# Patient Record
Sex: MALE | Race: WHITE | NOT HISPANIC OR LATINO | Employment: FULL TIME | URBAN - METROPOLITAN AREA
[De-identification: names, ages, dates, MRNs, and addresses within clinical notes are randomized per-mention and may not be internally consistent; named-entity substitution may affect disease eponyms.]

---

## 2020-02-19 ENCOUNTER — TELEPHONE (OUTPATIENT)
Dept: SCHEDULING | Facility: IMAGING CENTER | Age: 60
End: 2020-02-19

## 2020-02-21 ENCOUNTER — OFFICE VISIT (OUTPATIENT)
Dept: URGENT CARE | Facility: PHYSICIAN GROUP | Age: 60
End: 2020-02-21
Payer: COMMERCIAL

## 2020-02-21 VITALS
WEIGHT: 219.6 LBS | BODY MASS INDEX: 29.1 KG/M2 | DIASTOLIC BLOOD PRESSURE: 86 MMHG | HEIGHT: 73 IN | RESPIRATION RATE: 18 BRPM | OXYGEN SATURATION: 96 % | TEMPERATURE: 98.4 F | HEART RATE: 84 BPM | SYSTOLIC BLOOD PRESSURE: 120 MMHG

## 2020-02-21 DIAGNOSIS — H65.92 FLUID LEVEL BEHIND TYMPANIC MEMBRANE OF LEFT EAR: ICD-10-CM

## 2020-02-21 PROCEDURE — 99203 OFFICE O/P NEW LOW 30 MIN: CPT | Performed by: NURSE PRACTITIONER

## 2020-02-21 RX ORDER — FLUTICASONE PROPIONATE 50 MCG
1 SPRAY, SUSPENSION (ML) NASAL DAILY
Qty: 16 G | Refills: 0 | Status: SHIPPED | OUTPATIENT
Start: 2020-02-21 | End: 2020-10-16

## 2020-02-21 ASSESSMENT — ENCOUNTER SYMPTOMS
COUGH: 0
HEADACHES: 0
FEVER: 0
BLURRED VISION: 0
CHILLS: 0
DOUBLE VISION: 0
NAUSEA: 0
DIZZINESS: 0

## 2020-02-21 NOTE — PROGRESS NOTES
Subjective:      Kameron Garcia is a 59 y.o. male who presents with Ringing in Ear (L ear, cant hear, plugged up, ringing, x1 week )            Ear Fullness   This is a new problem. Episode onset: 7 days. The problem occurs constantly. The problem has been unchanged. Pertinent negatives include no chills, congestion, coughing, fever, headaches or nausea. Associated symptoms comments: Patient reports urgent care for new problem.  States that last Saturday he suddenly felt as though his left ear filled up with fluid and he felt underwater since then.  Feels as though he cannot hear as well out of his left ear.  Denies headache, tingling, visual changes, nausea, vomiting, recent URI. Nothing aggravates the symptoms. He has tried nothing for the symptoms.       Review of Systems   Constitutional: Negative for chills and fever.   HENT: Negative for congestion, ear discharge and ear pain.    Eyes: Negative for blurred vision and double vision.   Respiratory: Negative for cough.    Gastrointestinal: Negative for nausea.   Neurological: Negative for dizziness and headaches.       History reviewed. No pertinent past medical history. History reviewed. No pertinent surgical history.   Social History     Socioeconomic History   • Marital status:      Spouse name: Not on file   • Number of children: Not on file   • Years of education: Not on file   • Highest education level: Not on file   Occupational History   • Not on file   Social Needs   • Financial resource strain: Not on file   • Food insecurity     Worry: Not on file     Inability: Not on file   • Transportation needs     Medical: Not on file     Non-medical: Not on file   Tobacco Use   • Smoking status: Never Smoker   • Smokeless tobacco: Never Used   Substance and Sexual Activity   • Alcohol use: Not on file   • Drug use: Not on file   • Sexual activity: Not on file   Lifestyle   • Physical activity     Days per week: Not on file     Minutes per session: Not on  "file   • Stress: Not on file   Relationships   • Social connections     Talks on phone: Not on file     Gets together: Not on file     Attends Sikhism service: Not on file     Active member of club or organization: Not on file     Attends meetings of clubs or organizations: Not on file     Relationship status: Not on file   • Intimate partner violence     Fear of current or ex partner: Not on file     Emotionally abused: Not on file     Physically abused: Not on file     Forced sexual activity: Not on file   Other Topics Concern   • Not on file   Social History Narrative   • Not on file    Allergies: Codeine       Objective:     /86 (BP Location: Right arm, Patient Position: Sitting, BP Cuff Size: Adult)   Pulse 84   Temp 36.9 °C (98.4 °F) (Temporal)   Resp 18   Ht 1.854 m (6' 1\")   Wt 99.6 kg (219 lb 9.6 oz)   SpO2 96%   BMI 28.97 kg/m²      Physical Exam  Vitals signs reviewed.   Constitutional:       Appearance: Normal appearance.   HENT:      Right Ear: Tympanic membrane, ear canal and external ear normal.      Left Ear: Ear canal and external ear normal. A middle ear effusion is present.      Nose: Nose normal.      Mouth/Throat:      Lips: Pink.      Mouth: Mucous membranes are moist.      Pharynx: Uvula midline.   Cardiovascular:      Rate and Rhythm: Normal rate and regular rhythm.      Heart sounds: Normal heart sounds.   Pulmonary:      Effort: Pulmonary effort is normal.      Breath sounds: Normal breath sounds.   Skin:     General: Skin is warm.   Neurological:      Mental Status: He is alert and oriented to person, place, and time.   Psychiatric:         Mood and Affect: Mood normal.         Behavior: Behavior normal.         Thought Content: Thought content normal.         Judgment: Judgment normal.                 Assessment/Plan:       1. Fluid level behind tympanic membrane of left ear  - fluticasone (FLONASE) 50 MCG/ACT nasal spray; Spray 1 Spray in nose every day.  Dispense: 16 g; " Refill: 0    Discussed physical examination findings as well as length patient symptoms may be due to allergic rhinitis putting pressure on his eustachian tubes causing a middle ear effusion.  Discussed supportive care including over-the-counter Benadryl and using fluticasone for sinusitis to assist with inflammation.    Supportive care, differential diagnoses, and indications for immediate follow-up discussed with patient.    Pathogenesis of diagnosis discussed including typical length and natural progression. Patient expresses understanding and agrees to plan.     Instructed patient to return to clinic for worsening symptoms or symptoms that persist for 14-21days    Please note that this dictation was created using voice recognition software. I have made every reasonable attempt to correct obvious errors, but I expect that there are errors of grammar and possibly content that I did not discover before finalizing the note.

## 2020-10-16 ENCOUNTER — OFFICE VISIT (OUTPATIENT)
Dept: MEDICAL GROUP | Facility: MEDICAL CENTER | Age: 60
End: 2020-10-16
Payer: COMMERCIAL

## 2020-10-16 ENCOUNTER — APPOINTMENT (OUTPATIENT)
Dept: LAB | Facility: MEDICAL CENTER | Age: 60
End: 2020-10-16
Attending: FAMILY MEDICINE
Payer: COMMERCIAL

## 2020-10-16 ENCOUNTER — HOSPITAL ENCOUNTER (OUTPATIENT)
Dept: RADIOLOGY | Facility: MEDICAL CENTER | Age: 60
End: 2020-10-16
Attending: FAMILY MEDICINE
Payer: COMMERCIAL

## 2020-10-16 VITALS
BODY MASS INDEX: 29.69 KG/M2 | HEIGHT: 73 IN | OXYGEN SATURATION: 96 % | TEMPERATURE: 97.6 F | SYSTOLIC BLOOD PRESSURE: 124 MMHG | RESPIRATION RATE: 16 BRPM | WEIGHT: 224 LBS | DIASTOLIC BLOOD PRESSURE: 70 MMHG | HEART RATE: 74 BPM

## 2020-10-16 DIAGNOSIS — Z13.0 SCREENING FOR ENDOCRINE, METABOLIC AND IMMUNITY DISORDER: ICD-10-CM

## 2020-10-16 DIAGNOSIS — Z13.228 SCREENING FOR ENDOCRINE, METABOLIC AND IMMUNITY DISORDER: ICD-10-CM

## 2020-10-16 DIAGNOSIS — Z13.29 SCREENING FOR ENDOCRINE, METABOLIC AND IMMUNITY DISORDER: ICD-10-CM

## 2020-10-16 DIAGNOSIS — M25.562 CHRONIC PAIN OF BOTH KNEES: ICD-10-CM

## 2020-10-16 DIAGNOSIS — G89.29 CHRONIC PAIN OF BOTH KNEES: ICD-10-CM

## 2020-10-16 DIAGNOSIS — Z23 NEED FOR VACCINATION: ICD-10-CM

## 2020-10-16 DIAGNOSIS — M25.561 CHRONIC PAIN OF BOTH KNEES: ICD-10-CM

## 2020-10-16 DIAGNOSIS — Z12.5 ENCOUNTER FOR SCREENING FOR MALIGNANT NEOPLASM OF PROSTATE: ICD-10-CM

## 2020-10-16 DIAGNOSIS — Z11.59 NEED FOR HEPATITIS C SCREENING TEST: ICD-10-CM

## 2020-10-16 DIAGNOSIS — Z13.6 SCREENING FOR CARDIOVASCULAR CONDITION: ICD-10-CM

## 2020-10-16 DIAGNOSIS — Z12.11 SCREEN FOR COLON CANCER: ICD-10-CM

## 2020-10-16 DIAGNOSIS — H65.92 FLUID LEVEL BEHIND TYMPANIC MEMBRANE OF LEFT EAR: ICD-10-CM

## 2020-10-16 PROCEDURE — 73565 X-RAY EXAM OF KNEES: CPT

## 2020-10-16 PROCEDURE — 99214 OFFICE O/P EST MOD 30 MIN: CPT | Mod: 25 | Performed by: FAMILY MEDICINE

## 2020-10-16 PROCEDURE — 90471 IMMUNIZATION ADMIN: CPT | Performed by: FAMILY MEDICINE

## 2020-10-16 PROCEDURE — 90715 TDAP VACCINE 7 YRS/> IM: CPT | Performed by: FAMILY MEDICINE

## 2020-10-16 RX ORDER — DIPHENHYDRAMINE HCL 25 MG
25 CAPSULE ORAL EVERY 6 HOURS PRN
COMMUNITY
End: 2020-10-16

## 2020-10-16 RX ORDER — AZELASTINE 1 MG/ML
1 SPRAY, METERED NASAL 2 TIMES DAILY
Qty: 30 ML | Refills: 1 | Status: SHIPPED | OUTPATIENT
Start: 2020-10-16 | End: 2021-04-16

## 2020-10-16 SDOH — HEALTH STABILITY: MENTAL HEALTH: HOW OFTEN DO YOU HAVE A DRINK CONTAINING ALCOHOL?: NEVER

## 2020-10-16 ASSESSMENT — PATIENT HEALTH QUESTIONNAIRE - PHQ9: CLINICAL INTERPRETATION OF PHQ2 SCORE: 0

## 2020-10-16 NOTE — PROGRESS NOTES
"cc: Left ear issue    Subjective:     Kameron Garcia is a 60 y.o. male presenting with his significant other to establish care:    1.  Left ear issue: Since February, he has noticed in his left ear a plugging sensation.  He has also noticed some hearing loss.  His right ear has been normal.  Denies any fevers, nasal congestion, seasonal allergies, lightheadedness, dizziness.  He went to urgent care, has tried a course of Benadryl and Flonase.  Both have been unhelpful.    2.  Knee pain: Has been having bilateral knee pain, achiness and stiffness for the past 3 years.  Seems to be getting worse.  Pain does not radiate.  No associated symptoms.  Denies any numbness, tingling, weakness, redness, swelling.  Symptoms are worse with activity.  He is still quite active, is quite physical at work and goes to the gym regularly.  He has been taking ibuprofen or naproxen, which does seem to help somewhat.    Review of systems:  See above.  All other systems are reviewed and are negative      Current Outpatient Medications:   •  azelastine (ASTELIN) 137 MCG/SPRAY nasal spray, Spray 1 Spray in nose 2 times a day., Disp: 30 mL, Rfl: 1    Allergies, past medical history, past surgical history, family history, social history reviewed and updated    Objective:     Vitals: /70   Pulse 74   Temp 36.4 °C (97.6 °F)   Resp 16   Ht 1.854 m (6' 1\")   Wt 101.6 kg (224 lb)   SpO2 96%   BMI 29.55 kg/m²   General: Alert, pleasant, NAD  HEENT: Normocephalic.  EOMI, no icterus or pallor.  Conjunctivae and lids normal. External ears normal. Tympanic membranes pearly, opaque on right.  TM dull, white on the left.  Oropharynx non-erythematous, mucous membranes moist.  Neck supple.  No thyromegaly or masses palpated. No cervical or supraclavicular lymphadenopathy.  Heart: Regular rate and rhythm.  S1 and S2 normal.  No murmurs appreciated.  Respiratory: Normal respiratory effort.  Clear to auscultation bilaterally.  Abdomen: " Non-distended, soft  Skin: Warm, dry, no rashes.  Musculoskeletal: Gait is normal.  Moves all extremities well.  Extremities: No leg edema.  Normal knees  Psych:  Affect/mood is normal, judgement is good, memory is intact, grooming is appropriate.    Assessment/Plan:     Kameron was seen today for establish care.    Diagnoses and all orders for this visit:    Fluid level behind tympanic membrane of left ear  New problem.  We discussed trying over-the-counter Sudafed, will also try Astelin nasal spray.  He can stop the Benadryl and Flonase as they do not seem to be helping.  If no improvement, he will let us know, will try a course of prednisone.  Will consider referral to ENT afterwards if needed.  -     azelastine (ASTELIN) 137 MCG/SPRAY nasal spray; Spray 1 Spray in nose 2 times a day.    Chronic pain of both knees  New problem, we discussed possible arthritis.  Will check x-rays.  Continue ibuprofen or naproxen in the meantime.  -     DX-KNEES-AP BILATERAL STANDING; Future    Screen for colon cancer  -     COLOGUARD (FIT DNA)    Screening for cardiovascular condition  -     Lipid Profile; Future    Screening for endocrine, metabolic and immunity disorder  -     CBC WITHOUT DIFFERENTIAL; Future  -     Comp Metabolic Panel; Future  -     TSH WITH REFLEX TO FT4; Future    Need for hepatitis C screening test  -     HEP C VIRUS ANTIBODY; Future    Encounter for screening for malignant neoplasm of prostate  -     PROSTATE SPECIFIC AG SCREENING; Future    Need for vaccination  -     Tdap Vaccine =>8YO IM        Return in about 6 months (around 4/16/2021) for routine follow up.

## 2020-10-23 ENCOUNTER — HOSPITAL ENCOUNTER (OUTPATIENT)
Dept: LAB | Facility: MEDICAL CENTER | Age: 60
End: 2020-10-23
Attending: FAMILY MEDICINE
Payer: COMMERCIAL

## 2020-10-23 DIAGNOSIS — Z13.29 SCREENING FOR ENDOCRINE, METABOLIC AND IMMUNITY DISORDER: ICD-10-CM

## 2020-10-23 DIAGNOSIS — Z13.228 SCREENING FOR ENDOCRINE, METABOLIC AND IMMUNITY DISORDER: ICD-10-CM

## 2020-10-23 DIAGNOSIS — Z13.0 SCREENING FOR ENDOCRINE, METABOLIC AND IMMUNITY DISORDER: ICD-10-CM

## 2020-10-23 DIAGNOSIS — Z11.59 NEED FOR HEPATITIS C SCREENING TEST: ICD-10-CM

## 2020-10-23 DIAGNOSIS — Z12.5 ENCOUNTER FOR SCREENING FOR MALIGNANT NEOPLASM OF PROSTATE: ICD-10-CM

## 2020-10-23 DIAGNOSIS — Z13.6 SCREENING FOR CARDIOVASCULAR CONDITION: ICD-10-CM

## 2020-10-23 LAB
ALBUMIN SERPL BCP-MCNC: 4.4 G/DL (ref 3.2–4.9)
ALBUMIN/GLOB SERPL: 1.3 G/DL
ALP SERPL-CCNC: 74 U/L (ref 30–99)
ALT SERPL-CCNC: 18 U/L (ref 2–50)
ANION GAP SERPL CALC-SCNC: 10 MMOL/L (ref 7–16)
AST SERPL-CCNC: 22 U/L (ref 12–45)
BILIRUB SERPL-MCNC: 0.9 MG/DL (ref 0.1–1.5)
BUN SERPL-MCNC: 12 MG/DL (ref 8–22)
CALCIUM SERPL-MCNC: 9.8 MG/DL (ref 8.5–10.5)
CHLORIDE SERPL-SCNC: 104 MMOL/L (ref 96–112)
CHOLEST SERPL-MCNC: 243 MG/DL (ref 100–199)
CO2 SERPL-SCNC: 27 MMOL/L (ref 20–33)
CREAT SERPL-MCNC: 0.66 MG/DL (ref 0.5–1.4)
ERYTHROCYTE [DISTWIDTH] IN BLOOD BY AUTOMATED COUNT: 42.2 FL (ref 35.9–50)
GLOBULIN SER CALC-MCNC: 3.4 G/DL (ref 1.9–3.5)
GLUCOSE SERPL-MCNC: 88 MG/DL (ref 65–99)
HCT VFR BLD AUTO: 46.7 % (ref 42–52)
HCV AB SER QL: NORMAL
HDLC SERPL-MCNC: 55 MG/DL
HGB BLD-MCNC: 15.9 G/DL (ref 14–18)
LDLC SERPL CALC-MCNC: 170 MG/DL
MCH RBC QN AUTO: 31.9 PG (ref 27–33)
MCHC RBC AUTO-ENTMCNC: 34 G/DL (ref 33.7–35.3)
MCV RBC AUTO: 93.6 FL (ref 81.4–97.8)
PLATELET # BLD AUTO: 244 K/UL (ref 164–446)
PMV BLD AUTO: 10.1 FL (ref 9–12.9)
POTASSIUM SERPL-SCNC: 4.5 MMOL/L (ref 3.6–5.5)
PROT SERPL-MCNC: 7.8 G/DL (ref 6–8.2)
PSA SERPL-MCNC: 1.28 NG/ML (ref 0–4)
RBC # BLD AUTO: 4.99 M/UL (ref 4.7–6.1)
SODIUM SERPL-SCNC: 141 MMOL/L (ref 135–145)
TRIGL SERPL-MCNC: 92 MG/DL (ref 0–149)
TSH SERPL DL<=0.005 MIU/L-ACNC: 5.05 UIU/ML (ref 0.38–5.33)
WBC # BLD AUTO: 4 K/UL (ref 4.8–10.8)

## 2020-10-23 PROCEDURE — 85027 COMPLETE CBC AUTOMATED: CPT

## 2020-10-23 PROCEDURE — 84443 ASSAY THYROID STIM HORMONE: CPT

## 2020-10-23 PROCEDURE — 80061 LIPID PANEL: CPT

## 2020-10-23 PROCEDURE — 80053 COMPREHEN METABOLIC PANEL: CPT

## 2020-10-23 PROCEDURE — 36415 COLL VENOUS BLD VENIPUNCTURE: CPT

## 2020-10-23 PROCEDURE — 84153 ASSAY OF PSA TOTAL: CPT

## 2020-10-23 PROCEDURE — 86803 HEPATITIS C AB TEST: CPT

## 2020-10-24 DIAGNOSIS — E78.5 HYPERLIPIDEMIA, UNSPECIFIED HYPERLIPIDEMIA TYPE: ICD-10-CM

## 2021-04-16 ENCOUNTER — OFFICE VISIT (OUTPATIENT)
Dept: MEDICAL GROUP | Facility: MEDICAL CENTER | Age: 61
End: 2021-04-16
Payer: COMMERCIAL

## 2021-04-16 VITALS
OXYGEN SATURATION: 96 % | WEIGHT: 224 LBS | HEIGHT: 73 IN | TEMPERATURE: 97.6 F | BODY MASS INDEX: 29.69 KG/M2 | DIASTOLIC BLOOD PRESSURE: 72 MMHG | HEART RATE: 74 BPM | SYSTOLIC BLOOD PRESSURE: 126 MMHG | RESPIRATION RATE: 16 BRPM

## 2021-04-16 DIAGNOSIS — H65.92 FLUID LEVEL BEHIND TYMPANIC MEMBRANE OF LEFT EAR: ICD-10-CM

## 2021-04-16 DIAGNOSIS — M17.0 PRIMARY OSTEOARTHRITIS OF BOTH KNEES: ICD-10-CM

## 2021-04-16 DIAGNOSIS — K04.7 TOOTH INFECTION: ICD-10-CM

## 2021-04-16 DIAGNOSIS — E78.5 HYPERLIPIDEMIA, UNSPECIFIED HYPERLIPIDEMIA TYPE: ICD-10-CM

## 2021-04-16 DIAGNOSIS — G89.29 CHRONIC PAIN OF BOTH KNEES: ICD-10-CM

## 2021-04-16 DIAGNOSIS — M25.561 CHRONIC PAIN OF BOTH KNEES: ICD-10-CM

## 2021-04-16 DIAGNOSIS — M25.562 CHRONIC PAIN OF BOTH KNEES: ICD-10-CM

## 2021-04-16 PROCEDURE — 99214 OFFICE O/P EST MOD 30 MIN: CPT | Performed by: FAMILY MEDICINE

## 2021-04-16 RX ORDER — AMOXICILLIN AND CLAVULANATE POTASSIUM 875; 125 MG/1; MG/1
1 TABLET, FILM COATED ORAL 2 TIMES DAILY
Qty: 14 TABLET | Refills: 0 | Status: SHIPPED | OUTPATIENT
Start: 2021-04-16 | End: 2021-04-23

## 2021-04-16 RX ORDER — ROSUVASTATIN CALCIUM 20 MG/1
20 TABLET, COATED ORAL EVERY EVENING
Qty: 90 TABLET | Refills: 1 | Status: SHIPPED | OUTPATIENT
Start: 2021-04-16 | End: 2022-08-10 | Stop reason: SINTOL

## 2021-04-16 RX ORDER — CETIRIZINE HYDROCHLORIDE 10 MG/1
10 TABLET ORAL DAILY
Qty: 90 TABLET | Refills: 1 | Status: SHIPPED | OUTPATIENT
Start: 2021-04-16 | End: 2022-08-10

## 2021-04-16 ASSESSMENT — FIBROSIS 4 INDEX: FIB4 SCORE: 1.28

## 2021-04-16 ASSESSMENT — PATIENT HEALTH QUESTIONNAIRE - PHQ9: CLINICAL INTERPRETATION OF PHQ2 SCORE: 0

## 2021-04-16 NOTE — PROGRESS NOTES
"  Subjective:     Kameron Garcia is a 60 y.o. male presenting for follow-up.  On his last labs, his cholesterol was quite elevated.  He is willing to start a medication for this.  He plans to start eating healthier, remains quite active, exercises regularly.  His does continue to have knee pain, is interested in injections.  His ear pain and congestion persists despite the Flonase, Astelin, Sudafed.  He also has a tooth infection on the right upper side of his mouth.  He broke several teeth several years ago and never had it repaired.  He plans to see a dentist after he returns from his trip from [place] later this month.        Current Outpatient Medications:   •  rosuvastatin (CRESTOR) 20 MG Tab, Take 1 tablet by mouth every evening., Disp: 90 tablet, Rfl: 1  •  amoxicillin-clavulanate (AUGMENTIN) 875-125 MG Tab, Take 1 tablet by mouth 2 times a day for 7 days., Disp: 14 tablet, Rfl: 0  •  cetirizine (ZYRTEC) 10 MG Tab, Take 1 tablet by mouth every day., Disp: 90 tablet, Rfl: 1    Objective:     Vitals: /72   Pulse 74   Temp 36.4 °C (97.6 °F)   Resp 16   Ht 1.854 m (6' 1\")   Wt 102 kg (224 lb)   SpO2 96%   BMI 29.55 kg/m²   General: Alert  HEENT: Normocephalic    Assessment/Plan:     Kameron was seen today for follow-up.    Diagnoses and all orders for this visit:    Hyperlipidemia, unspecified hyperlipidemia type  Chronic, stable.  Discussed recent labs, new diagnosis of hyperlipidemia.  We will start rosuvastatin.  Follow-up in 6 months  -     rosuvastatin (CRESTOR) 20 MG Tab; Take 1 tablet by mouth every evening.    Chronic pain of both knees  Primary osteoarthritis of both knees  Chronic, stable, referral to sports medicine placed  -     REFERRAL TO SPORTS MEDICINE    Fluid level behind tympanic membrane of left ear  Chronic, stable.  We will try a course of Zyrtec.  If symptoms persist, will order a CT sinus  -     cetirizine (ZYRTEC) 10 MG Tab; Take 1 tablet by mouth every day.    Tooth " infection  Self-limited issue, Augmentin prescription sent to pharmacy  -     amoxicillin-clavulanate (AUGMENTIN) 875-125 MG Tab; Take 1 tablet by mouth 2 times a day for 7 days.        Return in about 6 months (around 10/16/2021) for Med check.

## 2021-05-07 ENCOUNTER — OFFICE VISIT (OUTPATIENT)
Dept: MEDICAL GROUP | Facility: CLINIC | Age: 61
End: 2021-05-07
Payer: COMMERCIAL

## 2021-05-07 VITALS
RESPIRATION RATE: 18 BRPM | HEIGHT: 73 IN | HEART RATE: 75 BPM | WEIGHT: 224 LBS | SYSTOLIC BLOOD PRESSURE: 132 MMHG | BODY MASS INDEX: 29.69 KG/M2 | OXYGEN SATURATION: 96 % | TEMPERATURE: 98.3 F | DIASTOLIC BLOOD PRESSURE: 90 MMHG

## 2021-05-07 DIAGNOSIS — M17.0 PRIMARY OSTEOARTHRITIS OF BOTH KNEES: ICD-10-CM

## 2021-05-07 PROCEDURE — 99214 OFFICE O/P EST MOD 30 MIN: CPT | Mod: 25 | Performed by: FAMILY MEDICINE

## 2021-05-07 PROCEDURE — 20610 DRAIN/INJ JOINT/BURSA W/O US: CPT | Mod: 50 | Performed by: FAMILY MEDICINE

## 2021-05-07 RX ORDER — TRIAMCINOLONE ACETONIDE 40 MG/ML
40 INJECTION, SUSPENSION INTRA-ARTICULAR; INTRAMUSCULAR ONCE
Status: COMPLETED | OUTPATIENT
Start: 2021-05-07 | End: 2021-05-07

## 2021-05-07 RX ADMIN — TRIAMCINOLONE ACETONIDE 40 MG: 40 INJECTION, SUSPENSION INTRA-ARTICULAR; INTRAMUSCULAR at 10:19

## 2021-05-07 RX ADMIN — TRIAMCINOLONE ACETONIDE 40 MG: 40 INJECTION, SUSPENSION INTRA-ARTICULAR; INTRAMUSCULAR at 10:18

## 2021-05-07 ASSESSMENT — FIBROSIS 4 INDEX: FIB4 SCORE: 1.296362432175337128

## 2021-05-07 NOTE — PROGRESS NOTES
"CHIEF COMPLAINT:  Kameron Garcia male presenting at the request of Sobeida Malik M.D.  for evaluation of knee pain.     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 No pertinent past medical history.    PMH:  has no past medical history on file.  MEDS:   Current Outpatient Medications:   •  rosuvastatin (CRESTOR) 20 MG Tab, Take 1 tablet by mouth every evening., Disp: 90 tablet, Rfl: 1  •  cetirizine (ZYRTEC) 10 MG Tab, Take 1 tablet by mouth every day., Disp: 90 tablet, Rfl: 1  ALLERGIES:   Allergies   Allergen Reactions   • Codeine      SURGHX: No past surgical history on file.  SOCHX:  reports that he has never smoked. He has never used smokeless tobacco. He reports that he does not drink alcohol and does not use drugs.  FH: Family history was reviewed, no pertinent findings to report     PHYSICAL EXAM:  /90 (BP Location: Left arm, Patient Position: Sitting, BP Cuff Size: Large adult)   Pulse 75   Temp 36.8 °C (98.3 °F) (Temporal)   Resp 18   Ht 1.854 m (6' 1\")   Wt 102 kg (224 lb)   SpO2 96%   BMI 29.55 kg/m²      well-developed, well-nourished in no apparent distress, alert and oriented x 3.  Gait: antalgic     RIGHT Knee:  Severe Varus and No swelling   Range of Motion Slightly limited with Flexion  1+ effusion  Patellar Medial facet " tenderness and No tenderness and no apprehension  Medial Joint Line Tenderness and NEGATIVE Darcy  Lateral Joint Line Non-tender and NEGATIVE Darcy  Trace Laxity with Varus stress  Trace Laxity with Valgus stress  Lachman's testing is Trace  Posterior Drawer Testing is Trace  The leg is otherwise neurovascularly intact    LEFT Knee:  Severe Varus and No Swelling   Range of Motion Intact  1+ effusion  Patellar Medial facet tenderness and No tenderness and no apprehension  Medial Joint Line Tenderness and NEGATIVE Darcy  Lateral Joint Line Non-tender and NEGATIVE Darcy  Trace Laxity with Varus stress  Trace Laxity with Valgus stress  Lachman's testing is Trace  Posterior Drawer Testing is Trace  The leg is otherwise neurovascularly intact    Additional Findings: None and Tight hamstrings    1. Primary osteoarthritis of both knees  triamcinolone acetonide (KENALOG-40) injection 40 mg    triamcinolone acetonide (KENALOG-40) injection 40 mg       Discussed knee osteoarthritis management options includin.  Joint protection, SAMe and anti-inflammatories   2.  Non-offending activities such as cycling or swimming   3.  Knee bracing, Acupuncture  4. Injection options including corticosteroid, viscosupplementation and stem cell injections  5. Surgical options    Patient has elected to proceed with BILATERAL corticosteroid injection which was performed in the office TODAY (May 7, 2021)    Return in about 4 weeks (around 2021).   To see how he is doing after his BILATERAL knee corticosteroid injections        10/16/2020 11:29 AM     HISTORY/REASON FOR EXAM:  Atraumatic Pain/Swelling/Deformity.  Knee pain.     TECHNIQUE/EXAM DESCRIPTION AND NUMBER OF VIEWS:   1 views of the standing bilateral knees.     COMPARISON: None     FINDINGS:  Mild/moderate varus angulations of both knees.  Degenerative changes of both knee joints with marked narrowing of the medial knee joint compartments.     Mild  osteopenia.     IMPRESSION:     Mild/moderate varus angulations of the knees.  Bilateral knee joint degenerative changes.        done elsewhere and reviewed independently by me    Thank you Sobeida Malik M.D. for allowing me to participate in caring for your patient.

## 2021-05-07 NOTE — PROCEDURES
PROCEDURE NOTE:  bilateral Knee corticosteroid injection  Risks and benefits discussed  Informed consent obtained  Knee prepped in sterile fashion utilizing a echo- inferior approach  40 mg of Kenalog and 5 cc of bupivacaine injected into the knee joint space  Vapocoolant spray was utilized  Patient tolerated the procedure well  Postprocedure care and red flags discussed

## 2021-06-02 DIAGNOSIS — H65.92 FLUID LEVEL BEHIND TYMPANIC MEMBRANE OF LEFT EAR: ICD-10-CM

## 2021-06-02 DIAGNOSIS — H92.02 LEFT EAR PAIN: ICD-10-CM

## 2021-06-02 DIAGNOSIS — R09.81 NASAL CONGESTION: ICD-10-CM

## 2021-06-04 ENCOUNTER — APPOINTMENT (OUTPATIENT)
Dept: RADIOLOGY | Facility: IMAGING CENTER | Age: 61
End: 2021-06-04
Attending: FAMILY MEDICINE
Payer: COMMERCIAL

## 2021-06-04 ENCOUNTER — OFFICE VISIT (OUTPATIENT)
Dept: MEDICAL GROUP | Facility: CLINIC | Age: 61
End: 2021-06-04
Payer: COMMERCIAL

## 2021-06-04 VITALS
OXYGEN SATURATION: 96 % | SYSTOLIC BLOOD PRESSURE: 140 MMHG | DIASTOLIC BLOOD PRESSURE: 78 MMHG | HEART RATE: 68 BPM | TEMPERATURE: 98.2 F | BODY MASS INDEX: 29.69 KG/M2 | HEIGHT: 73 IN | WEIGHT: 224 LBS

## 2021-06-04 DIAGNOSIS — G89.29 CHRONIC LEFT SHOULDER PAIN: ICD-10-CM

## 2021-06-04 DIAGNOSIS — M19.012 OSTEOARTHRITIS OF GLENOHUMERAL JOINT, LEFT: ICD-10-CM

## 2021-06-04 DIAGNOSIS — M17.0 PRIMARY OSTEOARTHRITIS OF BOTH KNEES: ICD-10-CM

## 2021-06-04 DIAGNOSIS — M25.512 CHRONIC LEFT SHOULDER PAIN: ICD-10-CM

## 2021-06-04 DIAGNOSIS — M75.32 CALCIFIC TENDINITIS OF LEFT SHOULDER: ICD-10-CM

## 2021-06-04 PROCEDURE — 99214 OFFICE O/P EST MOD 30 MIN: CPT | Mod: 25 | Performed by: FAMILY MEDICINE

## 2021-06-04 PROCEDURE — 73030 X-RAY EXAM OF SHOULDER: CPT | Mod: TC,LT | Performed by: FAMILY MEDICINE

## 2021-06-04 PROCEDURE — 20610 DRAIN/INJ JOINT/BURSA W/O US: CPT | Mod: LT | Performed by: FAMILY MEDICINE

## 2021-06-04 RX ORDER — TRIAMCINOLONE ACETONIDE 40 MG/ML
40 INJECTION, SUSPENSION INTRA-ARTICULAR; INTRAMUSCULAR ONCE
Status: COMPLETED | OUTPATIENT
Start: 2021-06-04 | End: 2021-06-04

## 2021-06-04 RX ADMIN — TRIAMCINOLONE ACETONIDE 40 MG: 40 INJECTION, SUSPENSION INTRA-ARTICULAR; INTRAMUSCULAR at 10:24

## 2021-06-04 ASSESSMENT — FIBROSIS 4 INDEX: FIB4 SCORE: 1.296362432175337128

## 2021-06-04 NOTE — PROCEDURES
PROCEDURE NOTE:  left Shoulder glenohumeral corticosteroid injection  Risks and benefits discussed  Informed consent obtained  Shoulder prepped in sterile fashion utilizing a posterior approach  40 mg of Kenalog and 5 cc of bupivacaine injected into the glenohumeral space  Vapocoolant spray was utilized  Patient tolerated the procedure well  Postprocedure care and red flags discussed

## 2021-06-04 NOTE — PROGRESS NOTES
"CHIEF COMPLAINT:    Kameron Garcia is here for follow-up for his bilateral knee pain  present for 2 years  Known OA  Pain is at the anterior, lateral, anteromedial knee  Quality is aching  Pain is non-radiating   Improved with resting  Aggravated by movement and getting up after seated  no prior problems with this area in the past   Is approximately 50 to 60% improved with corticosteroid injections  Still having some pain with standing for excessive's/long periods of time and at the end of the day  Otherwise, he is pleased with his improvement    Today's complaining of NEW problem BILATERAL shoulder pain  LEFT greater than right shoulder pain  No specific trauma  Pain for the past several years  Getting worse  Worse with activity  Occasional night symptoms  Some stiffness, and painful clicking/popping    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  Gait: antalgic     RIGHT Knee:  Severe Varus and No swelling   Range of Motion Slightly limited with Flexion  1+ effusion  Patellar Medial facet tenderness and No tenderness and no apprehension    LEFT Knee:  Severe Varus and No Swelling   Range of Motion Intact  1+ effusion  Patellar Medial facet tenderness and No tenderness and no apprehension  Medial Joint Line Tenderness and NEGATIVE Darcy    BILATERAL shoulder exam:  Range of motion limited with abduction, worse on the LEFT compared to right  Strength testing NORMAL with empty can, internal rotation, external rotation and lift off testing  NO tenderness of the supraspinatus, infraspinatus or biceps tendon  Apprehension testing POSITIVE on the LEFT compared to " the right  POSITIVE crepitus on the LEFT compared to right      1. Primary osteoarthritis of both knees     2. Osteoarthritis of glenohumeral joint, left  DX-SHOULDER 2+ LEFT    triamcinolone acetonide (KENALOG-40) injection 40 mg   3. Calcific tendinitis of left shoulder         Patient has elected to proceed with BILATERAL knee intra-articular corticosteroid injection which was performed (May 7, 2021) HELPED    TODAY (June 4, 2021) we performed a LEFT shoulder intra-articular glenohumeral joint corticosteroid injection            6/4/2021 9:41 AM     HISTORY/REASON FOR EXAM:  Atraumatic Pain/Swelling/Deformity        TECHNIQUE/EXAM DESCRIPTION AND NUMBER OF VIEWS:  3 views of the LEFT shoulder.     COMPARISON: None     FINDINGS:  There is no evidence of fracture or dislocation. Glenohumeral and acromioclavicular articulation is maintained. There is prominent glenohumeral joint space narrowing and spurring. There are mild degenerative changes of the acromioclavicular joint. There   is spurring of the inferior aspect of the acromion. Visualized left lung field is clear. Calcific densities adjacent to the humeral head can be seen in calcific tendinopathy. Atherosclerotic calcification is seen.        IMPRESSION:     1.  Advanced glenohumeral osteoarthritis.  2.  Mild degenerative changes of the acromioclavicular joint.  3.  Calcific densities adjacent to the humeral head can be seen in calcific tendinopathy.  4.  Spurring of the inferior aspect of the acromion can predispose to impingement.  5.  Atherosclerotic plaque          10/16/2020 11:29 AM     HISTORY/REASON FOR EXAM:  Atraumatic Pain/Swelling/Deformity.  Knee pain.     TECHNIQUE/EXAM DESCRIPTION AND NUMBER OF VIEWS:   1 views of the standing bilateral knees.     COMPARISON: None     FINDINGS:  Mild/moderate varus angulations of both knees.  Degenerative changes of both knee joints with marked narrowing of the medial knee joint compartments.     Mild  osteopenia.     IMPRESSION:     Mild/moderate varus angulations of the knees.  Bilateral knee joint degenerative changes.        Thank you Sobeida Malik M.D. for allowing me to participate in caring for your patient.

## 2021-06-09 ENCOUNTER — TELEPHONE (OUTPATIENT)
Dept: MEDICAL GROUP | Facility: MEDICAL CENTER | Age: 61
End: 2021-06-09

## 2021-06-09 DIAGNOSIS — E78.5 HYPERLIPIDEMIA, UNSPECIFIED HYPERLIPIDEMIA TYPE: ICD-10-CM

## 2021-06-09 NOTE — TELEPHONE ENCOUNTER
"Phone Number Called: [phone]    Call outcome: Patient Spouse left     Message: Spouse called to ask for labs to be ordered for Kameron's CT. She mentioned that someone informed her that he would need a \"Creatinine/BUN, CMP, BNP or BMP\". Labs not ordered at this time. Will send to provider.     "

## 2021-06-10 NOTE — TELEPHONE ENCOUNTER
Phone Number Called: [phone]    Call outcome: Spoke to patient regarding message below.    Message: Called to inform patient that provider did place an order for a BMP. Left VM requesting call back.

## 2021-06-11 ENCOUNTER — HOSPITAL ENCOUNTER (OUTPATIENT)
Dept: LAB | Facility: MEDICAL CENTER | Age: 61
End: 2021-06-11
Attending: FAMILY MEDICINE
Payer: COMMERCIAL

## 2021-06-11 DIAGNOSIS — E78.5 HYPERLIPIDEMIA, UNSPECIFIED HYPERLIPIDEMIA TYPE: ICD-10-CM

## 2021-06-11 LAB
ANION GAP SERPL CALC-SCNC: 7 MMOL/L (ref 7–16)
BUN SERPL-MCNC: 11 MG/DL (ref 8–22)
CALCIUM SERPL-MCNC: 9.3 MG/DL (ref 8.5–10.5)
CHLORIDE SERPL-SCNC: 105 MMOL/L (ref 96–112)
CO2 SERPL-SCNC: 27 MMOL/L (ref 20–33)
CREAT SERPL-MCNC: 0.79 MG/DL (ref 0.5–1.4)
GLUCOSE SERPL-MCNC: 94 MG/DL (ref 65–99)
POTASSIUM SERPL-SCNC: 4.2 MMOL/L (ref 3.6–5.5)
SODIUM SERPL-SCNC: 139 MMOL/L (ref 135–145)

## 2021-06-11 PROCEDURE — 36415 COLL VENOUS BLD VENIPUNCTURE: CPT

## 2021-06-11 PROCEDURE — 80048 BASIC METABOLIC PNL TOTAL CA: CPT

## 2021-06-25 ENCOUNTER — HOSPITAL ENCOUNTER (OUTPATIENT)
Dept: RADIOLOGY | Facility: MEDICAL CENTER | Age: 61
End: 2021-06-25
Attending: FAMILY MEDICINE
Payer: COMMERCIAL

## 2021-06-25 DIAGNOSIS — H92.02 LEFT EAR PAIN: ICD-10-CM

## 2021-06-25 DIAGNOSIS — R09.81 NASAL CONGESTION: ICD-10-CM

## 2021-06-25 DIAGNOSIS — H65.92 FLUID LEVEL BEHIND TYMPANIC MEMBRANE OF LEFT EAR: ICD-10-CM

## 2021-06-25 PROCEDURE — 70487 CT MAXILLOFACIAL W/DYE: CPT

## 2021-06-25 PROCEDURE — 700117 HCHG RX CONTRAST REV CODE 255: Performed by: FAMILY MEDICINE

## 2021-06-25 RX ADMIN — IOHEXOL 80 ML: 350 INJECTION, SOLUTION INTRAVENOUS at 13:13

## 2021-06-28 ENCOUNTER — TELEPHONE (OUTPATIENT)
Dept: MEDICAL GROUP | Facility: MEDICAL CENTER | Age: 61
End: 2021-06-28

## 2021-06-28 NOTE — TELEPHONE ENCOUNTER
Phone Number Called: [phone]    Call outcome: Spoke to patient regarding message below.    Message: Called to inform patient of message below. Patient has no other questions at this time.                    ----- Message from Sobeida Malik M.D. sent at 6/25/2021  2:10 PM PDT -----  Please let pt know that his CT scan was overall normal.  There are no sinus issues, no congestion identified.  I recommend following up with his dentist.

## 2022-01-04 ENCOUNTER — OFFICE VISIT (OUTPATIENT)
Dept: SPORTS MEDICINE | Facility: CLINIC | Age: 62
End: 2022-01-04
Payer: COMMERCIAL

## 2022-01-04 ENCOUNTER — APPOINTMENT (OUTPATIENT)
Dept: RADIOLOGY | Facility: IMAGING CENTER | Age: 62
End: 2022-01-04
Attending: FAMILY MEDICINE
Payer: COMMERCIAL

## 2022-01-04 VITALS
TEMPERATURE: 98.1 F | HEIGHT: 73 IN | WEIGHT: 224 LBS | OXYGEN SATURATION: 96 % | BODY MASS INDEX: 29.69 KG/M2 | DIASTOLIC BLOOD PRESSURE: 90 MMHG | HEART RATE: 79 BPM | RESPIRATION RATE: 18 BRPM | SYSTOLIC BLOOD PRESSURE: 132 MMHG
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
## 2022-01-04 DIAGNOSIS — M25.511 ACUTE PAIN OF RIGHT SHOULDER: ICD-10-CM

## 2022-01-04 PROCEDURE — 73030 X-RAY EXAM OF SHOULDER: CPT | Mod: TC,RT | Performed by: FAMILY MEDICINE

## 2022-01-04 PROCEDURE — 99214 OFFICE O/P EST MOD 30 MIN: CPT | Performed by: FAMILY MEDICINE

## 2022-01-04 RX ORDER — TRAMADOL HYDROCHLORIDE 50 MG/1
50 TABLET ORAL EVERY 8 HOURS PRN
Qty: 28 TABLET | Refills: 0 | Status: SHIPPED | OUTPATIENT
Start: 2022-01-04 | End: 2022-01-14 | Stop reason: SDUPTHER

## 2022-01-04 ASSESSMENT — FIBROSIS 4 INDEX: FIB4 SCORE: 1.296362432175337128

## 2022-01-04 NOTE — LETTER
January 4, 2022    To Whom It May Concern:         This is confirmation that Kameron Edrandal Jose attended his scheduled appointment with Ziggy Lozoya M.D. on 1/04/22.    Recommend avoiding any overhead activity or lifting greater than 25 pounds until reevaluation in 3 weeks.         If you have any questions please do not hesitate to call me at the phone number listed below.    Sincerely,          Ziggy Lozoya M.D.  [phone]

## 2022-01-04 NOTE — PROGRESS NOTES
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[address], fuel delivery  Retired  and appliance repair     PHYSICAL EXAM:  /90 (BP Location: Left arm, Patient Position: Sitting, BP Cuff Size: Large adult)   Pulse 79   Temp 36.7 °C (98.1 °F) (Temporal)   Resp 18   Ht 1.854 m (6' 1\")   Wt 102 kg (224 lb)   SpO2 96%   BMI 29.55 kg/m²     No acute distress    Cervical spine:  Range of motion INTACT  Spurling's testing NEGATIVE bilaterally  No cervical spine tenderness    BILATERAL shoulder exam:  Range of motion LIMITED with abduction BILATERALLY   Strength testing 5/5 with empty can WITH pain, internal rotation, 4/5 external rotation WITH pain on the RIGHT compared to the left and lift off testing  NO tenderness of the supraspinatus, infraspinatus or biceps tendon  Apprehension testing POSITIVE  Rubs positive crepitus with range of motion    1. Acute pain of right shoulder  DX-SHOULDER 2+ RIGHT    traMADol (ULTRAM) 50 MG Tab   2. Fall at home, initial encounter  DX-SHOULDER 2+ RIGHT    traMADol (ULTRAM) 50 MG Tab     (June 4, 2021) we performed a LEFT shoulder intra-articular glenohumeral joint corticosteroid injection     tramadol since his pain does not seem to improve with NSAIDs and Tylenol    Check MRI to assess for rotator cuff tear, particularly infraspinatus/teres minor as he is having pain and weakness with external " rotation of the RIGHT shoulder with a history of acute trauma on December 20, 2021    Follow-up after MRI to discuss results and further management options        1/4/2022 11:05 AM     HISTORY/REASON FOR EXAM:  traumatic Pain/Swelling/Deformity; fall 12/20/2021, with pain and weakness.        TECHNIQUE/EXAM DESCRIPTION AND NUMBER OF VIEWS:  3 views of the  RIGHT shoulder.     COMPARISON: Contralateral shoulder radiographs dated 6/4/2021     FINDINGS:     MINERALIZATION: Mineralization is unremarkable for age.     INJURY: No acute fracture or gross malalignment is seen.     JOINTS: No erosive arthropathy is evident.  Degenerative spurring of the acromioclavicular joint. Degenerative changes of the glenohumeral joint.           IMPRESSION:     1.  No radiographic evidence of acute traumatic injury.  2.  Degenerative changes of the acromioclavicular and glenohumeral joints.             Exam Ended: 01/04/22 11:06 AM Last Resulted: 01/04/22 11:21 AM                   6/4/2021 9:41 AM     HISTORY/REASON FOR EXAM:  Atraumatic Pain/Swelling/Deformity        TECHNIQUE/EXAM DESCRIPTION AND NUMBER OF VIEWS:  3 views of the LEFT shoulder.     COMPARISON: None     FINDINGS:  There is no evidence of fracture or dislocation. Glenohumeral and acromioclavicular articulation is maintained. There is prominent glenohumeral joint space narrowing and spurring. There are mild degenerative changes of the acromioclavicular joint. There   is spurring of the inferior aspect of the acromion. Visualized left lung field is clear. Calcific densities adjacent to the humeral head can be seen in calcific tendinopathy. Atherosclerotic calcification is seen.        IMPRESSION:     1.  Advanced glenohumeral osteoarthritis.  2.  Mild degenerative changes of the acromioclavicular joint.  3.  Calcific densities adjacent to the humeral head can be seen in calcific tendinopathy.  4.  Spurring of the inferior aspect of the acromion can predispose to  impingement.  5.  Atherosclerotic plaque          10/16/2020 11:29 AM     HISTORY/REASON FOR EXAM:  Atraumatic Pain/Swelling/Deformity.  Knee pain.     TECHNIQUE/EXAM DESCRIPTION AND NUMBER OF VIEWS:   1 views of the standing bilateral knees.     COMPARISON: None     FINDINGS:  Mild/moderate varus angulations of both knees.  Degenerative changes of both knee joints with marked narrowing of the medial knee joint compartments.     Mild osteopenia.     IMPRESSION:     Mild/moderate varus angulations of the knees.  Bilateral knee joint degenerative changes.        Thank you Sobeida Malik M.D. for allowing me to participate in caring for your patient.

## 2022-01-05 ENCOUNTER — TELEPHONE (OUTPATIENT)
Dept: SPORTS MEDICINE | Facility: CLINIC | Age: 62
End: 2022-01-05

## 2022-01-05 NOTE — TELEPHONE ENCOUNTER
Willie Lozoya,    The patient called in regards to an MRI order that you were going to order. I looked into the chart and I did not find one.    Please adviseJanet
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
## 2022-01-06 DIAGNOSIS — R29.898 SHOULDER WEAKNESS: ICD-10-CM

## 2022-01-06 DIAGNOSIS — M25.511 ACUTE PAIN OF RIGHT SHOULDER: ICD-10-CM

## 2022-01-06 NOTE — PROGRESS NOTES
1. Acute pain of right shoulder  MR-SHOULDER-W/O RIGHT   2. Fall at home, initial encounter  MR-SHOULDER-W/O RIGHT   3. Shoulder weakness  MR-SHOULDER-W/O RIGHT     Called and spoke with patient   unfortunately, we did not place MRI order at his last visit as anticipated  Patient's pain is now worse  Shoulder weakness persists    Stat MRI order placed since there was an acute injury and patient did have shoulder weakness with now worsening pain and weakness of the RIGHT upper extremity after fall/acute injury

## 2022-01-14 ENCOUNTER — HOSPITAL ENCOUNTER (OUTPATIENT)
Dept: RADIOLOGY | Facility: MEDICAL CENTER | Age: 62
End: 2022-01-14
Attending: FAMILY MEDICINE
Payer: COMMERCIAL
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
## 2022-01-14 DIAGNOSIS — M19.011 OSTEOARTHRITIS OF GLENOHUMERAL JOINT, RIGHT: ICD-10-CM

## 2022-01-14 DIAGNOSIS — R29.898 SHOULDER WEAKNESS: ICD-10-CM

## 2022-01-14 DIAGNOSIS — M25.511 ACUTE PAIN OF RIGHT SHOULDER: ICD-10-CM

## 2022-01-14 DIAGNOSIS — S46.011A TRAUMATIC COMPLETE TEAR OF RIGHT ROTATOR CUFF, INITIAL ENCOUNTER: ICD-10-CM

## 2022-01-14 PROCEDURE — 73221 MRI JOINT UPR EXTREM W/O DYE: CPT | Mod: RT

## 2022-01-14 RX ORDER — TRAMADOL HYDROCHLORIDE 50 MG/1
50 TABLET ORAL EVERY 8 HOURS PRN
Qty: 28 TABLET | Refills: 0 | Status: SHIPPED | OUTPATIENT
Start: 2022-01-14 | End: 2022-01-28

## 2022-01-15 NOTE — PROGRESS NOTES
1. Traumatic complete tear of right rotator cuff, initial encounter  Referral to Orthopedics   2. Osteoarthritis of glenohumeral joint, right  Referral to Orthopedics   3. Acute pain of right shoulder     4. Fall at home, initial encounter       Called and notified patient of MRI results  Orthopedic consultation placed  Refill for tramadol to hold him off until he sees orthopedics and discuss his long-term treatment options      1/14/2022 11:53 AM     HISTORY/REASON FOR EXAM:  Shoulder trauma, rotator cuff tear suspected, xray done  Fall. Shoulder pain.     TECHNIQUE/EXAM DESCRIPTION:  MRI of the RIGHT shoulder without contrast.     The study was performed on a Jajah Signa 1.5 Melita MRI scanner. T1 sagittal, fast spin-echo T2 fat-suppressed oblique coronal, sagittal, and axial and intermediate fast spin-echo oblique coronal images were obtained.     COMPARISON:  None.     FINDINGS:     ROTATOR CUFF:  Complete full-thickness full width tear of the supraspinatus and infraspinatus tendons. The tendon retracts to the level of the glenoid  Increased signal within the atrophic supraspinatus and infraspinatus muscles.     GLENOHUMERAL JOINT: Moderate joint effusion.  - Labrum: Severely degenerated and tearing  - Cartilage: Severe cartilage thinning with complete cartilage denudation in the glenoid.  - Bone: Elevation of the humeral head abutting the acromion. Mild marginal osteophytes. No Hill-Sachs or osseous Bankart, or other fracture or osseous contusion.        LONG HEAD OF BICEPS TENDON:  Poorly seen due to motion artifact.     SUBACROMIAL-SUBDELTOID BURSA:  Moderate effusion due to full-thickness tear.     ACROMIOCLAVICULAR JOINT:  Moderate osteoarthritis     __________________________________     IMPRESSION:     Limited exam due to motion artifact.     1. Massive full-thickness rotator cuff tear involving the entire supraspinatus and infraspinatus tendons. Edema, strain and atrophy of the supraspinatus and  infraspinatus muscles.     2. The long head of biceps tendon is poorly seen due to motion artifact. Tear and retraction is possible.     3. Severe osteoarthritis of the glenohumeral joint. Moderate glenohumeral joint effusion.             Exam Ended: 01/14/22 12:40 PM Last Resulted: 01/14/22  1:01 PM

## 2022-01-21 ENCOUNTER — TELEPHONE (OUTPATIENT)
Dept: SPORTS MEDICINE | Facility: CLINIC | Age: 62
End: 2022-01-21
Payer: COMMERCIAL

## 2022-01-22 ENCOUNTER — OCCUPATIONAL MEDICINE (OUTPATIENT)
Dept: URGENT CARE | Facility: CLINIC | Age: 62
End: 2022-01-22
Payer: COMMERCIAL

## 2022-01-22 VITALS
HEIGHT: 72 IN | SYSTOLIC BLOOD PRESSURE: 122 MMHG | RESPIRATION RATE: 16 BRPM | WEIGHT: 226.4 LBS | TEMPERATURE: 98.4 F | DIASTOLIC BLOOD PRESSURE: 78 MMHG | BODY MASS INDEX: 30.66 KG/M2 | HEART RATE: 64 BPM | OXYGEN SATURATION: 97 %

## 2022-01-22 DIAGNOSIS — M25.512 ACUTE PAIN OF LEFT SHOULDER: ICD-10-CM

## 2022-01-22 DIAGNOSIS — S46.011A TRAUMATIC COMPLETE TEAR OF RIGHT ROTATOR CUFF, INITIAL ENCOUNTER: ICD-10-CM

## 2022-01-22 DIAGNOSIS — M25.562 ACUTE PAIN OF LEFT KNEE: ICD-10-CM

## 2022-01-22 PROCEDURE — 99213 OFFICE O/P EST LOW 20 MIN: CPT | Performed by: PHYSICIAN ASSISTANT

## 2022-01-22 ASSESSMENT — FIBROSIS 4 INDEX: FIB4 SCORE: 1.296362432175337128

## 2022-01-22 ASSESSMENT — ENCOUNTER SYMPTOMS
SENSORY CHANGE: 0
FALLS: 1
NAUSEA: 0
FEVER: 0
TINGLING: 0
BLURRED VISION: 0
VOMITING: 0
CHILLS: 0
PALPITATIONS: 0
SORE THROAT: 0
SHORTNESS OF BREATH: 0

## 2022-01-22 NOTE — LETTER
[facility] [place] [id number] [facility] [address] [place] [address]  Phone:  [phone] - Fax:  [phone]   [facility] Progress Report and Disability Certification  Date of Service: 1/22/2022   No Show:  No  Date / Time of Next Visit: 1/26/2022 8:00 AM @ [facility] med   Claim Information   Patient Name: Kameron Garcia  Claim Number:     Employer:   [employer] Date of Injury: 12/20/2021     Insurer / TPA: Louise Abdi  ID / SSN:     Occupation:   Diagnosis: Diagnoses of Traumatic complete tear of right rotator cuff, initial encounter, Acute pain of left shoulder, and Acute pain of left knee were pertinent to this visit.    Medical Information   Related to Industrial Injury? Yes    Subjective Complaints:  DOI: 12/20/2021    Kameron Garcia is a 61 y.o. male who presents today for evaluation of a work-related injury.  Patient reports that he works in [place] at [employer] as a .  On day of the incident he notes that it was icy and he walked through a warehouse towards the back office and as he was approaching the door he slipped on the concrete and his legs were not monitored with him and he fell back and landed hard mostly on his left side.  He reports that his left knee was twisted underneath of him and he hit both of his shoulders.  His right shoulder was what was bothering him the most and it got even worse by the next morning.  Patient ended up seeing [facility] [place] sports medicine clinic on 1/4/2022 for initial visit after these incidents.  Right shoulder x-ray was done which did not reveal any bony abnormalities but MRI was ordered to check for rotator cuff tear.  Patient ended up having a MRI of his right shoulder on 1/14 which showed traumatic complete tear of the right rotator cuff.  A referral to orthopedics was placed.  Patient went to schedule his appointment at [facility] but was told that they do not accept Workmen's Comp. claims  "there and he was told to come to the urgent care to get the proper work comp paperwork filled out so that he can go through the proper channels.  At initial visit on 2022 left shoulder x-ray was also performed which showed advanced glenohumeral osteoarthritis, possible calcific tendinopathy, and mild AC joint degenerative changes but no acute processes.  X-rays of the knees were also done which did not show any abnormalities.     Objective Findings: Exam from  visit with [facility]: \"BILATERAL shoulder exam:  Range of motion LIMITED with abduction BILATERALLY   Strength testing 5/5 with empty can WITH pain, internal rotation, 4/5 external rotation WITH pain on the RIGHT compared to the left and lift off testing  NO tenderness of the supraspinatus, infraspinatus or biceps tendon  Apprehension testing POSITIVE  Rubs positive crepitus with range of motion\"     Pre-Existing Condition(s):     Assessment:   Initial Visit    Status: Additional Care Required  Permanent Disability:No    Plan: Transfer Care    Diagnostics:      Comments:       Disability Information   Status: Released to Restricted Duty    From:  2022  Through: 2022 Restrictions are: Temporary   Physical Restrictions   Sitting:    Standing:  < or = to 2 hrs/day Stooping:    Bending:      Squattin hrs/day Walking:  < or = to 2 hrs/day Climbin hrs/day Pushing:      Pulling:    Other:    Reaching Above Shoulder (L): 0 hrs/day Reaching Above Shoulder (R): 0 hrs/day     Reaching Below Shoulder (L):    Reaching Below Shoulder (R):      Not to exceed Weight Limits   Carrying(hrs):   Weight Limit(lb):   Lifting(hrs):   Weight  Limit(lb):     Comments: No lifting anything heavier than 5 pounds.    Repetitive Actions   Hands: i.e. Fine Manipulations from Grasping:     Feet: i.e. Operating Foot Controls:     Driving / Operate Machinery:     Health Care Provider’s Original or Electronic Signature  ALISHA Snow-C. [facility] " Care Provider’s Original or Electronic Signature    Geovanni Zazueta MD         Clinic Name / Location: [address]  [place] [address] Clinic Phone Number: Dept: [phone]   Appointment Time: 10:15 Am Visit Start Time: 12:07 PM   Check-In Time:  10:56 Am Visit Discharge Time:     Original-Treating Physician or Chiropractor    Page 2-Insurer/TPA    Page 3-Employer    Page 4-Employee

## 2022-01-22 NOTE — LETTER
EMPLOYEE’S CLAIM FOR COMPENSATION/ REPORT OF INITIAL TREATMENT  FORM C-4    EMPLOYEE’S CLAIM - PROVIDE ALL INFORMATION REQUESTED   First Name  Kameron Last Name  Jose Birthdate                    1960                Sex  male Claim Number (Insurer’s Use Only)    Home Address  [id number] Age  61 y.o. Height  1.829 m (6') Weight  103 kg (226 lb 6.4 oz) SSN     [facility] Zip  [address] Telephone  [phone] (home)    Mailing Address  [phone] [facility] [facility] Zip  [address] Primary Language Spoken  English    Insurer   Third-Party   Louise Abdi   Employee's Occupation (Job Title) When Injury or Occupational Disease Occurred      Employer's Name/Company Name    [employer] Telephone  [phone]    Office Mail Address (Number and Street)   [phone] [facility]  Zip  [address]    Date of Injury  12/20/2021               Hours Injury  5:30 PM Date Employer Notified  12/20/2021 Last Day of Work after Injury     or Occupational Disease  12/20/2021 Supervisor to Whom Injury     Reported  Marino Pacheco   Address or Location of Accident (if applicable)  Work [1]   What were you doing at the time of accident? (if applicable)  Walking in the warehouse    How did this injury or occupational disease occur? (Be specific an answer in detail. Use additional sheet if necessary)  Slipped and fell on concrete   If you believe that you have an occupational disease, when did you first have knowledge of the disability and it relationship to your employment?  N/A Witnesses to the Accident  Camera      Nature of Injury or Occupational Disease  Contusion  Part(s) of Body Injured or Affected  Knee (L), Shoulder (L), Shoulder (R)    I certify that the above is true and correct to the best of my knowledge and that I have provided this information in order to obtain the  benefits of [place]  I hereby authorize any physician, chiropractor, surgeon, practitioner, or other person, any hospital, including [facility], any medical service organization, any insurance company, or other institution or organization to release to each other, any medical or other information, including benefits paid or payable, pertinent to this injury or disease, except information relative to diagnosis, treatment and/or counseling for AIDS, psychological conditions, alcohol or controlled substances, for which I must give specific authorization.  A Photostat of this authorization shall be as valid as the original.     Date   Place Employee’s Original or  *Electronic Signature   THIS REPORT MUST BE COMPLETED AND MAILED WITHIN 3 WORKING DAYS OF TREATMENT   Place  [facility]   Date  1/22/2022 Diagnosis and Description of Injury or Occupational Disease  (S46.011A) Traumatic complete tear of right rotator cuff, initial encounter  (M25.512) Acute pain of left shoulder  (M25.562) Acute pain of left knee Is there evidence the injured employee was under the influence of alcohol and/or another controlled substance at the time of accident?  ? No ? Yes (if yes, please explain)    Hour  12:07 PM   Diagnoses of Traumatic complete tear of right rotator cuff, initial encounter, Acute pain of left shoulder, and Acute pain of left knee were pertinent to this visit. No   Treatment  Referral to orthopedics and occupational medicine  Have you advised the patient to remain off work five days or     more?    X-Ray Findings    Comments:Positive MRI findings for complete right rotator cuff tear   ? Yes Indicate dates:   From   To      From information given by the employee, together with medical evidence, can        you directly connect this injury or  "occupational disease as job incurred?  Yes ? No If no, is the injured employee capable of:  ? full duty  No ? modified duty      Is additional medical care by a physician indicated?  Yes If Modified Duty, Specify any Limitations / Restrictions      Do you know of any previous injury or disease contributing to this condition or occupational disease?  ? Yes ? No (Explain if yes)                          No   Date  1/22/2022 Print Health Care Provider's   Napoleon Stovall P.A.-C. I certify the employer’s copy of  this form was mailed on:   Address  [address] Insurer’s Use Only     [facility] Zip  [address]    Provider’s Tax ID Number  [id number] Telephone  Dept: [phone]             Health Care Provider’s Original or Electronic Signature  e-NAPOLEON Duenas P.A.-C. Degree (MD,DO, DC,ARTIS,APRN)   PAANTHONY      * Complete and attach Release of Information (Form C-4A) when injured employee signs C-4 Form electronically  ORIGINAL - TREATING HEALTHCARE PROVIDER PAGE 2 - INSURER/TPA PAGE 3 - EMPLOYER PAGE 4 - EMPLOYEE             Form C-4 (rev.08/21)           BRIEF DESCRIPTION OF RIGHTS AND BENEFITS  (Pursuant to [place] 616[place].050)    Notice of Injury or Occupational Disease (Incident Report Form C-1): If an injury or occupational disease (OD) arises out of and in the course of employment, you must provide written notice to your employer as soon as practicable, but no later than 7 days after the accident or OD. Your employer shall maintain a sufficient supply of the required forms.    Claim for Compensation (Form C-4): If medical treatment is sought, the form C-4 is available at the place of initial treatment. A completed \"Claim for Compensation\" (Form C-4) must be filed within 90 days after an accident or OD. The treating physician or chiropractor must, within 3 working days after treatment, complete and mail to the employer, the employer's insurer and third-party , the Claim for " Compensation.    Medical Treatment: If you require medical treatment for your on-the-job injury or OD, you may be required to select a physician or chiropractor from a list provided by your workers’ compensation insurer, if it has contracted with an Organization for Managed Care (MCO) or Preferred Provider Organization (PPO) or providers of health care. If your employer has not entered into a contract with an MCO or PPO, you may select a physician or chiropractor from the Panel of Physicians and Chiropractors. Any medical costs related to your industrial injury or OD will be paid by your insurer.    Temporary Total Disability (TTD): If your doctor has certified that you are unable to work for a period of at least 5 consecutive days, or 5 cumulative days in a 20-day period, or places restrictions on you that your employer does not accommodate, you may be entitled to TTD compensation.    Temporary Partial Disability (TPD): If the wage you receive upon reemployment is less than the compensation for TTD to which you are entitled, the insurer may be required to pay you TPD compensation to make up the difference. TPD can only be paid for a maximum of 24 months.    Permanent Partial Disability (PPD): When your medical condition is stable and there is an indication of a PPD as a result of your injury or OD, within 30 days, your insurer must arrange for an evaluation by a rating physician or chiropractor to determine the degree of your PPD. The amount of your PPD award depends on the date of injury, the results of the PPD evaluation, your age and wage.    Permanent Total Disability (PTD): If you are medically certified by a treating physician or chiropractor as permanently and totally disabled and have been granted a PTD status by your insurer, you are entitled to receive monthly benefits not to exceed 66 2/3% of your average monthly wage. The amount of your PTD payments is subject to reduction if you previously received a  [facility]-sum PPD award.    Vocational Rehabilitation Services: You may be eligible for vocational rehabilitation services if you are unable to return to the job due to a permanent physical impairment or permanent restrictions as a result of your injury or occupational disease.    Transportation and Per Ethan Reimbursement: You may be eligible for travel expenses and per ethan associated with medical treatment.    Reopening: You may be able to reopen your claim if your condition worsens after claim closure.     Appeal Process: If you disagree with a written determination issued by the insurer or the insurer does not respond to your request, you may appeal to the Department of Administration, , by following the instructions contained in your determination letter. You must appeal the determination within 70 days from the date of the determination letter at [address], [address], [place], [place] [address], or [address], [address], [place], [place] [address]. If you disagree with the  decision, you may appeal to the Department of Administration, . You must file your appeal within 30 days from the date of the  decision letter at [address], [address], [place], [place] [address], or [address], [address], [place], [place] [address]. If you disagree with a decision of an , you may file a petition for judicial review with the District Court. You must do so within 30 days of the Appeal Officer’s decision. You may be represented by an  at your own expense or you may contact the [facility] for possible representation.    [place]  for Injured Workers (NAIW): If you disagree with a  decision, you may request that NAIW represent you without charge at an  Hearing. For information regarding denial of benefits, you may contact the [facility] at: [address], [address], [place]  [address], [phone], or [address], [address], [place] [address], [phone]    To File a Complaint with the Division: If you wish to file a complaint with the  of the Division of Industrial Relations (DIR),  please contact the Workers’ Compensation Section, [address], [address], [place], [place] [address], telephone [phone]) [phone], or [address], [address], [place], [place] [address], telephone [phone].    For assistance with Workers’ Compensation Issues: You may contact the [facility] Office for Consumer Health Assistance, [address], [address], [place], [place] [address], Toll Free 1-483.349.9198, Web site: [email or web address].[email or web address].[email or web address]/Programs/[email or web address] E-mail: [email or web address]              __________________________________________________________________                                    _________________            Employee Name / Signature                                                                                                                            Date                                                                                                                                                                                                                              D-2 (rev. 10/20)

## 2022-01-22 NOTE — TELEPHONE ENCOUNTER
Dr. Lozoya,      The patient called in regards to the recent visit and stated it was work related. Per the patient, he stated he filed a claim under his employer and TPA. I told the patient that we would need him to come to any of the urgent cares to file a claim and to build the referrals. The patient will try to go into urgent care this weekend to start the process.    Janet

## 2022-01-22 NOTE — PROGRESS NOTES
Subjective     Kameron Garcia is a 61 y.o. male who presents with Other (NEW WC, Both shoulders and (L) knee, 12/20/2022)          DOI: 12/20/2021    Kameron Garcia is a 61 y.o. male who presents today for evaluation of a work-related injury.  Patient reports that he works in [place] at [employer] as a .  On day of the incident he notes that it was icy and he walked through a warehouse towards the back office and as he was approaching the door he slipped on the concrete and his legs were not monitored with him and he fell back and landed hard mostly on his left side.  He reports that his left knee was twisted underneath of him and he hit both of his shoulders.  His right shoulder was what was bothering him the most and it got even worse by the next morning.  Patient ended up seeing [facility] sports medicine clinic on 1/4/2022 for initial visit after these incidents.  Right shoulder x-ray was done which did not reveal any bony abnormalities but MRI was ordered to check for rotator cuff tear.  Patient ended up having a MRI of his right shoulder on 1/14 which showed traumatic complete tear of the right rotator cuff.  A referral to orthopedics was placed.  Patient went to schedule his appointment at [facility] but was told that they do not accept Workmen's Comp. claims there and he was told to come to the urgent care to get the proper work comp paperwork filled out so that he can go through the proper channels.  At initial visit on 1/4/2022 left shoulder x-ray was also performed which showed advanced glenohumeral osteoarthritis, possible calcific tendinopathy, and mild AC joint degenerative changes but no acute processes.  X-rays of the knees were also done which did not show any abnormalities.        Review of Systems   Constitutional: Negative for chills and fever.   HENT: Negative for sore throat.    Eyes: Negative for blurred vision.   Respiratory: Negative for shortness of  "breath.    Cardiovascular: Negative for chest pain and palpitations.   Gastrointestinal: Negative for nausea and vomiting.   Musculoskeletal: Positive for falls and joint pain (shoulders, left knee).   Neurological: Negative for tingling and sensory change.           Objective     /78 (BP Location: Right arm, Patient Position: Sitting, BP Cuff Size: Adult long)   Pulse 64   Temp 36.9 °C (98.4 °F) (Temporal)   Resp 16   Ht 1.829 m (6')   Wt 103 kg (226 lb 6.4 oz)   SpO2 97%   BMI 30.71 kg/m²      Physical Exam  Constitutional:       Appearance: He is well-developed.   HENT:      Head: Normocephalic and atraumatic.      Right Ear: External ear normal.      Left Ear: External ear normal.   Eyes:      Conjunctiva/sclera: Conjunctivae normal.      Pupils: Pupils are equal, round, and reactive to light.   Cardiovascular:      Rate and Rhythm: Normal rate and regular rhythm.      Heart sounds: Normal heart sounds. No murmur heard.      Pulmonary:      Effort: Pulmonary effort is normal.      Breath sounds: Normal breath sounds. No wheezing.   Skin:     General: Skin is warm and dry.      Capillary Refill: Capillary refill takes less than 2 seconds.   Neurological:      Mental Status: He is alert and oriented to person, place, and time.   Psychiatric:         Behavior: Behavior normal.         Judgment: Judgment normal.        Exam from 1/4 visit with [facility]: \"BILATERAL shoulder exam:  Range of motion LIMITED with abduction BILATERALLY   Strength testing 5/5 with empty can WITH pain, internal rotation, 4/5 external rotation WITH pain on the RIGHT compared to the left and lift off testing  NO tenderness of the supraspinatus, infraspinatus or biceps tendon  Apprehension testing POSITIVE  Rubs positive crepitus with range of motion\"    Assessment & Plan       1. Traumatic complete tear of right rotator cuff, initial encounter  - Referral to Occupational Medicine  - Referral to Orthopedics    2. " Acute pain of left shoulder  - Referral to Occupational Medicine  - Referral to Orthopedics    3. Acute pain of left knee  - Referral to Occupational Medicine  - Referral to Orthopedics      Referrals placed through proper channels.  Not sure how long it will take to process the orthopedic referral so we will have patient follow-up with occupational medicine on Wednesday.

## 2022-01-26 ENCOUNTER — OCCUPATIONAL MEDICINE (OUTPATIENT)
Dept: OCCUPATIONAL MEDICINE | Facility: CLINIC | Age: 62
End: 2022-01-26
Payer: COMMERCIAL

## 2022-01-26 VITALS
OXYGEN SATURATION: 96 % | WEIGHT: 231 LBS | DIASTOLIC BLOOD PRESSURE: 98 MMHG | BODY MASS INDEX: 30.62 KG/M2 | TEMPERATURE: 97.6 F | HEART RATE: 72 BPM | HEIGHT: 73 IN | SYSTOLIC BLOOD PRESSURE: 148 MMHG

## 2022-01-26 DIAGNOSIS — S83.92XD SPRAIN OF LEFT KNEE, UNSPECIFIED LIGAMENT, SUBSEQUENT ENCOUNTER: ICD-10-CM

## 2022-01-26 DIAGNOSIS — S46.011D TRAUMATIC COMPLETE TEAR OF RIGHT ROTATOR CUFF, SUBSEQUENT ENCOUNTER: ICD-10-CM

## 2022-01-26 DIAGNOSIS — S46.912D STRAIN OF LEFT SHOULDER, SUBSEQUENT ENCOUNTER: ICD-10-CM

## 2022-01-26 PROCEDURE — 99203 OFFICE O/P NEW LOW 30 MIN: CPT | Performed by: PREVENTIVE MEDICINE

## 2022-01-26 ASSESSMENT — FIBROSIS 4 INDEX: FIB4 SCORE: 1.296362432175337128

## 2022-01-26 NOTE — PROGRESS NOTES
"Subjective:     Kameron Garcia is a 61 y.o. male who presents for Follow-Up (DOI: 12/20/2021 both shoulders, left knee, upper back injury, the same, room 16)      DOI 12/20/2021: 61 y.o. male who presents today for right shoulder and left knee injury. YUIN:  He walked through a warehouse towards the back office and as he was approaching the door he slipped on the concrete,  he fell back and landed hard mostly on his left side.  He reports that his left knee was twisted underneath of him and he hit both of his shoulders. He was initially seen in Sports Med and was not reported to the clinic as a work comp injury.  X-ray of the right shoulder was negative for acute findings.  He was then referred for MRI of the right shoulder which did show acute tear of the supraspinatus and infraspinatus tendons.  He then reported to the clinic as a work comp case and was seen in urgent care and referral was made to orthopedics.  Patient states overall symptoms in the right shoulder about the same.  Continues have pain with any movements above shoulder level.  Has not heard back about orthopedic appointment yet.  He also notes that he has continued left shoulder pain which he states is equal to the right shoulder pain again mostly below shoulder level.  He also has some mild upper back pain.  He states the knee pain is improving and is minimal at this point.    ROS: All systems were reviewed on intake form, form was reviewed and signed. See scanned documents in media. Pertinent positives and negatives included in HPI.    PMH: No pertinent past medical history to this problem  MEDS: Medications were reviewed in Epic  ALLERGIES:   Allergies   Allergen Reactions   • Codeine      SOCHX: Works as a  at [employer]  FH: No pertinent family history to this problem       Objective:     /98   Pulse 72   Temp 36.4 °C (97.6 °F)   Ht 1.854 m (6' 1\")   Wt 105 kg (231 lb)   SpO2 96%   BMI 30.48 kg/m² "     Constitutional: Patient is in no acute distress. Appears well-developed and well-nourished.   HENT: Normocephalic and atraumatic. EOM are normal. No scleral icterus.   Cardiovascular: Normal rate.    Pulmonary/Chest: Effort normal. No respiratory distress.   Neurological: Patient is alert and oriented to person, place, and time.   Skin: Skin is warm and dry.   Psychiatric: Normal mood and affect. Behavior is normal.     Right shoulder: No gross deformity.  Tenderness to palpation anterior GH and lateral shoulder.  Range of motion diminished 120 degrees flexion and 90 degrees abduction.  Empty can test positive.  Some pain and weakness with resisted internal/external rotation.  Left shoulder: No gross deformity.  Tenderness to palpation over the lateral shoulder and upper trapezius.  Range of motion diminished to 120 degrees flexion and 90 degrees abduction.  Empty can test positive.  Some pain and weakness with resisted internal/external rotation.    Assessment/Plan:       1. Traumatic complete tear of right rotator cuff, subsequent encounter    2. Strain of left shoulder, subsequent encounter  - Referral to Radiology  - MR-SHOULDER-W/O LEFT; Future    3. Sprain of left knee, unspecified ligament, subsequent encounter    Released to Restricted Duty FROM 1/26/2022 TO 2/16/2022  Minimal use of the right arm.  Referral to orthopedics pending approval  Placed referral for MRI left shoulder  OTC ibuprofen/Tylenol as needed  Gentle range of motion exercises  Restricted duty  Follow-up 3 weeks if not seen by orthopedics    Differential diagnosis, natural history, supportive care, and indications for immediate follow-up discussed.    Approximately 35 minutes were spent in reviewing notes, preparing for visit, obtaining history, exam and evaluation, patient counseling/education and post visit documentation/orders.

## 2022-01-26 NOTE — LETTER
[address],   [address] [place] [address]  Phone:  [phone] - Fax:  [phone]   [facility] Progress Report and Disability Certification  Date of Service: 1/26/2022   No Show:  No  Date / Time of Next Visit: 2/16/2022 @ 9:15 AM    Claim Information   Patient Name: Kameron Garcia  Claim Number:     Employer:   [employer] Date of Injury: 12/20/2021     Insurer / TPA: Louise Abdi  ID / SSN:     Occupation:   Diagnosis: Diagnoses of Traumatic complete tear of right rotator cuff, subsequent encounter, Strain of left shoulder, subsequent encounter, and Sprain of left knee, unspecified ligament, subsequent encounter were pertinent to this visit.    Medical Information   Related to Industrial Injury? Yes    Subjective Complaints:  DOI 12/20/2021: 61 y.o. male who presents today for right shoulder and left knee injury. YUNI:  He walked through a warehouse towards the back office and as he was approaching the door he slipped on the concrete,  he fell back and landed hard mostly on his left side.  He reports that his left knee was twisted underneath of him and he hit both of his shoulders. He was initially seen in Sports Med and was not reported to the clinic as a work comp injury.  X-ray of the right shoulder was negative for acute findings.  He was then referred for MRI of the right shoulder which did show acute tear of the supraspinatus and infraspinatus tendons.  He then reported to the clinic as a work comp case and was seen in urgent care and referral was made to orthopedics.  Patient states overall symptoms in the right shoulder about the same.  Continues have pain with any movements above shoulder level.  Has not heard back about orthopedic appointment yet.  He also notes that he has continued left shoulder pain which he states is equal to the right shoulder pain again mostly below shoulder level.  He also has some mild upper  back pain.  He states the knee pain is improving and is minimal at this point.   Objective Findings: Right shoulder: No gross deformity.  Tenderness to palpation anterior GH and lateral shoulder.  Range of motion diminished 120 degrees flexion and 90 degrees abduction.  Empty can test positive.  Some pain and weakness with resisted internal/external rotation.  Left shoulder: No gross deformity.  Tenderness to palpation over the lateral shoulder and upper trapezius.  Range of motion diminished to 120 degrees flexion and 90 degrees abduction.  Empty can test positive.  Some pain and weakness with resisted internal/external rotation.   Pre-Existing Condition(s):     Assessment:   Condition Same    Status: Additional Care Required  Permanent Disability:No    Plan:      Diagnostics:      Comments:  Referral to orthopedics pending approval  Placed referral for MRI left shoulder  OTC ibuprofen/Tylenol as needed  Gentle range of motion exercises  Restricted duty  Follow-up 3 weeks if not seen by orthopedics    Disability Information   Status: Released to Restricted Duty    From:  1/26/2022  Through: 2/16/2022 Restrictions are: Temporary   Physical Restrictions   Sitting:    Standing:    Stooping:  < or = to 2 hrs/day Bending:  < or = to 2 hrs/day   Squatting:    Walking:    Climbing:    Pushing:  < or = to 1 hr/day   Pulling:  < or = to 1 hr/day Other:    Reaching Above Shoulder (L): 0 hrs/day Reaching Above Shoulder (R): 0 hrs/day     Reaching Below Shoulder (L):    Reaching Below Shoulder (R):      Not to exceed Weight Limits   Carrying(hrs):   Weight Limit(lb): < or = to 10 pounds Lifting(hrs):   Weight  Limit(lb): < or = to 10 pounds   Comments: Minimal use of the right arm.    Repetitive Actions   Hands: i.e. Fine Manipulations from Grasping:     Feet: i.e. Operating Foot Controls:     Driving / Operate Machinery:     Health Care Provider’s Original or Electronic Signature  Trung Orosco D.O. Health Care Provider’s  Original or Electronic Signature    Geovanni Zazueta MD         Clinic Name / Location: [address],   [address]  [place] [address] Clinic Phone Number: Dept: [phone]   Appointment Time: 8:00 Am Visit Start Time: 8:10 AM   Check-In Time:  8:08 Am Visit Discharge Time:  8:40 AM    Original-Treating Physician or Chiropractor    Page 2-Insurer/TPA    Page 3-Employer    Page 4-Employee

## 2023-01-11 PROBLEM — Z96.612 S/P REVERSE TOTAL SHOULDER ARTHROPLASTY, LEFT: Status: ACTIVE | Noted: 2023-01-11
